# Patient Record
Sex: FEMALE | Race: WHITE | NOT HISPANIC OR LATINO | URBAN - METROPOLITAN AREA
[De-identification: names, ages, dates, MRNs, and addresses within clinical notes are randomized per-mention and may not be internally consistent; named-entity substitution may affect disease eponyms.]

---

## 2017-05-21 ENCOUNTER — EMERGENCY (EMERGENCY)
Facility: HOSPITAL | Age: 32
LOS: 1 days | Discharge: HOME | End: 2017-05-21
Attending: EMERGENCY MEDICINE | Admitting: EMERGENCY MEDICINE
Payer: MEDICAID

## 2017-05-21 VITALS
SYSTOLIC BLOOD PRESSURE: 133 MMHG | OXYGEN SATURATION: 100 % | RESPIRATION RATE: 18 BRPM | HEART RATE: 96 BPM | DIASTOLIC BLOOD PRESSURE: 86 MMHG

## 2017-05-21 VITALS
OXYGEN SATURATION: 100 % | TEMPERATURE: 98 F | RESPIRATION RATE: 18 BRPM | SYSTOLIC BLOOD PRESSURE: 137 MMHG | HEART RATE: 102 BPM | DIASTOLIC BLOOD PRESSURE: 95 MMHG | WEIGHT: 289.47 LBS

## 2017-05-21 DIAGNOSIS — R10.9 UNSPECIFIED ABDOMINAL PAIN: ICD-10-CM

## 2017-05-21 DIAGNOSIS — K59.00 CONSTIPATION, UNSPECIFIED: ICD-10-CM

## 2017-05-21 PROCEDURE — 99283 EMERGENCY DEPT VISIT LOW MDM: CPT | Mod: 25

## 2017-05-21 PROCEDURE — 93005 ELECTROCARDIOGRAM TRACING: CPT

## 2017-05-21 PROCEDURE — 93010 ELECTROCARDIOGRAM REPORT: CPT

## 2017-05-21 PROCEDURE — 74020: CPT

## 2017-05-21 PROCEDURE — 74020: CPT | Mod: 26

## 2017-05-21 PROCEDURE — 99284 EMERGENCY DEPT VISIT MOD MDM: CPT | Mod: 25

## 2017-05-21 RX ORDER — SOD SULF/SODIUM/NAHCO3/KCL/PEG
240 SOLUTION, RECONSTITUTED, ORAL ORAL
Qty: 23040 | Refills: 0 | OUTPATIENT
Start: 2017-05-21 | End: 2017-05-22

## 2017-05-21 NOTE — ED PROVIDER NOTE - MEDICAL DECISION MAKING DETAILS
pt c/o abd pain and constipation x 2 d also wanting explanations for the ct findings (ovarian cyst and inguinal hernia), no concern for sbo or torsion based on abd exam, kub confirmed constipation, will tx w/golytely, high fiber diet advised, to f/u w/gyn for eval and management of cyst and surg for elective hernia repair, no indication to rescan at this time, pt understands and agrees w/plan

## 2017-05-21 NOTE — ED PROVIDER NOTE - ATTENDING CONTRIBUTION TO CARE
32yoF here with abd pain x one year, seen at urgent care 2 days ago for same and had CT scan done with findings of inguinal hernia and ovarian cyst, pt confused about diagnosis, asking for clarification and also reporting constipation x2 days, no n/v, no f/c, no urinary sx. Vitals stable, exam with nontender abd, xray with moderate stool, pt and family provided information and understanding of CT findings, d/c home, to f/up with pmd.

## 2017-05-21 NOTE — ED ADULT TRIAGE NOTE - ARRIVAL INFO ADDITIONAL COMMENTS
Pt C/O lower abdominal pain and nausea. Pt denies vomiting, fever, chills, vaginal bleed, SOB. Last BM was 2days ago. EKG in progress.

## 2017-05-21 NOTE — ED ADULT NURSE NOTE - OBJECTIVE STATEMENT
Patient c/o abdominal pain and constipation. Patient reports that she has history of ovarian cyst. Abdomen soft, non-distended, non-tender. Will continue to monitor patient.

## 2017-06-23 ENCOUNTER — EMERGENCY (EMERGENCY)
Facility: HOSPITAL | Age: 32
LOS: 1 days | Discharge: HOME | End: 2017-06-23
Attending: EMERGENCY MEDICINE | Admitting: EMERGENCY MEDICINE
Payer: SELF-PAY

## 2017-06-23 VITALS
TEMPERATURE: 98 F | RESPIRATION RATE: 17 BRPM | DIASTOLIC BLOOD PRESSURE: 84 MMHG | OXYGEN SATURATION: 100 % | SYSTOLIC BLOOD PRESSURE: 134 MMHG | HEART RATE: 115 BPM

## 2017-06-23 PROCEDURE — 99283 EMERGENCY DEPT VISIT LOW MDM: CPT | Mod: 25

## 2017-06-23 PROCEDURE — 73130 X-RAY EXAM OF HAND: CPT

## 2017-06-23 PROCEDURE — 73130 X-RAY EXAM OF HAND: CPT | Mod: 26,LT

## 2017-06-23 PROCEDURE — 99283 EMERGENCY DEPT VISIT LOW MDM: CPT

## 2017-06-23 RX ORDER — IBUPROFEN 200 MG
600 TABLET ORAL ONCE
Qty: 0 | Refills: 0 | Status: COMPLETED | OUTPATIENT
Start: 2017-06-23 | End: 2017-06-23

## 2017-06-23 RX ADMIN — Medication 600 MILLIGRAM(S): at 19:59

## 2017-06-23 NOTE — ED PROVIDER NOTE - OBJECTIVE STATEMENT
31yo female pt, steady gait, was brought by EMS c/o left 2,3,4th finger pain s/p injury today, jammed by a metal door (STAPLES). Denies other injuries. Denies sensory changes or weakness to fingers.

## 2017-06-23 NOTE — ED PROVIDER NOTE - PHYSICAL EXAMINATION
NAD, VSS, Afebrile, No spinal tender, + left 2,3,4th phalanx, around DIP tender, mid swelling, N/V- intact, no deformities.

## 2017-06-23 NOTE — ED PROVIDER NOTE - ATTENDING CONTRIBUTION TO CARE
Dr. Samano (Attending Physician)  I performed a history and physical exam of the patient and discussed their management with the advanced care provider. I reviewed the advanced care provider's note and agree with the documented findings and plan of care. My medical decision making and objective findings are found above.

## 2017-06-23 NOTE — ED PROVIDER NOTE - MEDICAL DECISION MAKING DETAILS
Dr. Samano (Attending Physician)  2nd - 4th digits hit in door.  ttp with contusion over mid 3rd phalanx, will xray and give ibuprofen reassess.

## 2017-06-23 NOTE — ED ADULT NURSE NOTE - OBJECTIVE STATEMENT
33yo female pt, steady gait, was brought by EMS c/o left 2,3,4th finger pain s/p injury today, jammed by a metal door (STAPLES). Denies other injuries.

## 2022-10-15 ENCOUNTER — EMERGENCY (EMERGENCY)
Facility: HOSPITAL | Age: 37
LOS: 1 days | Discharge: HOME | End: 2022-10-15
Attending: STUDENT IN AN ORGANIZED HEALTH CARE EDUCATION/TRAINING PROGRAM | Admitting: STUDENT IN AN ORGANIZED HEALTH CARE EDUCATION/TRAINING PROGRAM

## 2022-10-15 VITALS
HEART RATE: 109 BPM | TEMPERATURE: 98 F | OXYGEN SATURATION: 99 % | RESPIRATION RATE: 16 BRPM | SYSTOLIC BLOOD PRESSURE: 143 MMHG | DIASTOLIC BLOOD PRESSURE: 92 MMHG

## 2022-10-15 LAB
ALBUMIN SERPL ELPH-MCNC: 4.7 G/DL — SIGNIFICANT CHANGE UP (ref 3.3–5)
ALP SERPL-CCNC: 86 U/L — SIGNIFICANT CHANGE UP (ref 40–120)
ALT FLD-CCNC: 26 U/L — SIGNIFICANT CHANGE UP (ref 4–33)
ANION GAP SERPL CALC-SCNC: 12 MMOL/L — SIGNIFICANT CHANGE UP (ref 7–14)
AST SERPL-CCNC: 23 U/L — SIGNIFICANT CHANGE UP (ref 4–32)
BASOPHILS # BLD AUTO: 0.11 K/UL — SIGNIFICANT CHANGE UP (ref 0–0.2)
BASOPHILS NFR BLD AUTO: 1.1 % — SIGNIFICANT CHANGE UP (ref 0–2)
BILIRUB SERPL-MCNC: 0.4 MG/DL — SIGNIFICANT CHANGE UP (ref 0.2–1.2)
BUN SERPL-MCNC: 9 MG/DL — SIGNIFICANT CHANGE UP (ref 7–23)
CALCIUM SERPL-MCNC: 10 MG/DL — SIGNIFICANT CHANGE UP (ref 8.4–10.5)
CHLORIDE SERPL-SCNC: 98 MMOL/L — SIGNIFICANT CHANGE UP (ref 98–107)
CO2 SERPL-SCNC: 24 MMOL/L — SIGNIFICANT CHANGE UP (ref 22–31)
CREAT SERPL-MCNC: 0.71 MG/DL — SIGNIFICANT CHANGE UP (ref 0.5–1.3)
EGFR: 112 ML/MIN/1.73M2 — SIGNIFICANT CHANGE UP
EOSINOPHIL # BLD AUTO: 0.09 K/UL — SIGNIFICANT CHANGE UP (ref 0–0.5)
EOSINOPHIL NFR BLD AUTO: 0.9 % — SIGNIFICANT CHANGE UP (ref 0–6)
GLUCOSE SERPL-MCNC: 106 MG/DL — HIGH (ref 70–99)
HCG SERPL-ACNC: <5 MIU/ML — SIGNIFICANT CHANGE UP
HCT VFR BLD CALC: 39.3 % — SIGNIFICANT CHANGE UP (ref 34.5–45)
HGB BLD-MCNC: 12.3 G/DL — SIGNIFICANT CHANGE UP (ref 11.5–15.5)
IANC: 6.89 K/UL — SIGNIFICANT CHANGE UP (ref 1.8–7.4)
IMM GRANULOCYTES NFR BLD AUTO: 0.3 % — SIGNIFICANT CHANGE UP (ref 0–0.9)
LYMPHOCYTES # BLD AUTO: 2.06 K/UL — SIGNIFICANT CHANGE UP (ref 1–3.3)
LYMPHOCYTES # BLD AUTO: 21.4 % — SIGNIFICANT CHANGE UP (ref 13–44)
MCHC RBC-ENTMCNC: 25.3 PG — LOW (ref 27–34)
MCHC RBC-ENTMCNC: 31.3 GM/DL — LOW (ref 32–36)
MCV RBC AUTO: 80.7 FL — SIGNIFICANT CHANGE UP (ref 80–100)
MONOCYTES # BLD AUTO: 0.43 K/UL — SIGNIFICANT CHANGE UP (ref 0–0.9)
MONOCYTES NFR BLD AUTO: 4.5 % — SIGNIFICANT CHANGE UP (ref 2–14)
NEUTROPHILS # BLD AUTO: 6.89 K/UL — SIGNIFICANT CHANGE UP (ref 1.8–7.4)
NEUTROPHILS NFR BLD AUTO: 71.8 % — SIGNIFICANT CHANGE UP (ref 43–77)
NRBC # BLD: 0 /100 WBCS — SIGNIFICANT CHANGE UP (ref 0–0)
NRBC # FLD: 0 K/UL — SIGNIFICANT CHANGE UP (ref 0–0)
NT-PROBNP SERPL-SCNC: <5 PG/ML — SIGNIFICANT CHANGE UP
PLATELET # BLD AUTO: 486 K/UL — HIGH (ref 150–400)
POTASSIUM SERPL-MCNC: 3.4 MMOL/L — LOW (ref 3.5–5.3)
POTASSIUM SERPL-SCNC: 3.4 MMOL/L — LOW (ref 3.5–5.3)
PROT SERPL-MCNC: 7.7 G/DL — SIGNIFICANT CHANGE UP (ref 6–8.3)
RBC # BLD: 4.87 M/UL — SIGNIFICANT CHANGE UP (ref 3.8–5.2)
RBC # FLD: 13.7 % — SIGNIFICANT CHANGE UP (ref 10.3–14.5)
SODIUM SERPL-SCNC: 134 MMOL/L — LOW (ref 135–145)
TROPONIN T, HIGH SENSITIVITY RESULT: <6 NG/L — SIGNIFICANT CHANGE UP
WBC # BLD: 9.61 K/UL — SIGNIFICANT CHANGE UP (ref 3.8–10.5)
WBC # FLD AUTO: 9.61 K/UL — SIGNIFICANT CHANGE UP (ref 3.8–10.5)

## 2022-10-15 PROCEDURE — 93010 ELECTROCARDIOGRAM REPORT: CPT

## 2022-10-15 PROCEDURE — 99285 EMERGENCY DEPT VISIT HI MDM: CPT

## 2022-10-15 RX ORDER — ACETAMINOPHEN 500 MG
975 TABLET ORAL ONCE
Refills: 0 | Status: COMPLETED | OUTPATIENT
Start: 2022-10-15 | End: 2022-10-15

## 2022-10-15 RX ADMIN — Medication 975 MILLIGRAM(S): at 23:28

## 2022-10-15 NOTE — ED PROVIDER NOTE - PHYSICAL EXAMINATION
Physical Exam:  Gen: NAD, non-toxic appearing, awake alert   HEENT: normal conjunctiva, oral mucosa moist  Lung: CTAB, no respiratory distress, no wheezes/rhonchi/rales B/L, speaking in full sentences  CV: RRR  Abd: soft, NT, ND, no guarding, no rigidity, no rebound tenderness  MSK: no visible deformities  Neuro: No focal sensory or motor deficits  Skin: Warm, well perfused, no rash, no leg swelling  ~Kendall Taylor MD (PGY-3) Physical Exam:  Gen: NAD, non-toxic appearing, awake alert   HEENT: normal conjunctiva, oral mucosa moist  Lung: CTAB, no respiratory distress, no wheezes/rhonchi/rales B/L, speaking in full sentences  CV: RRR, TTP of substernal chest wall, worsening of chest pain when pt twists her torso  Abd: soft, NT, ND, no guarding, no rigidity, no rebound tenderness  MSK: no visible deformities  Neuro: No focal sensory or motor deficits  Skin: Warm, well perfused, no rash, no leg swelling  ~Kendall Taylor MD (PGY-3)

## 2022-10-15 NOTE — ED PROVIDER NOTE - PROGRESS NOTE DETAILS
Brandon CONNOLLY (PGY-3)  explained results of w/u to pt. given return precautions. pt has cards f/u. will d/c to home with pcp f/u and return precautions

## 2022-10-15 NOTE — ED PROVIDER NOTE - PATIENT PORTAL LINK FT
You can access the FollowMyHealth Patient Portal offered by [facility] by registering at the following website: [email or web address]. By joining [facility]’s FollowMyHealth portal, you will also be able to view your health information using other applications (apps) compatible with our system.

## 2022-10-15 NOTE — ED PROVIDER NOTE - CLINICAL SUMMARY MEDICAL DECISION MAKING FREE TEXT BOX
37F PMH HTN p/w substernal cp pounding in sensation w/o SOB while shopping 8 hrs ago, constant, no a/w n/v/diaphoresis. VSS in ED. ecg nondiagnostic. Lungs ctab, abd NT ,no leg swelling  Will eval for ACS vs. infectious etiology, although MSK etiology more likely. Unlikely PE or dissection based off exam and hx. Takotsubos also possible given pt has symptoms starting since recent emotional stressor  Plan: cardiac labs, cxr, pain control, reassess smyptoms

## 2022-10-15 NOTE — ED PROVIDER NOTE - NS ED ROS FT
CONST: no fevers, no chills  ENT: no sore throat  CV: +chest pain, no leg swelling  RESP: +shortness of breath, no cough  ABD: no abdominal pain, no nausea, no vomiting, no diarrhea  : no dysuria, no flank pain, no hematuria  MSK: no back pain, no extremity pain  NEURO: no headache or additional neurologic complaints  SKIN:  no rash

## 2022-10-15 NOTE — ED PROVIDER NOTE - ATTENDING CONTRIBUTION TO CARE
I have personally performed a face to face medical and diagnostic evaluation of the patient. I have discussed with and reviewed the Resident's note and agree with the History, ROS, Physical Exam and MDM unless otherwise indicated. A brief summary of my personal evaluation and impression can be found below.    37y Fw/  PMHx HTN, obesity and former smoker (quit 1 month ago) p/w substernal chest pressure, pounding in sensation w/ SOB while shopping 8 hrs ago. States pain has been constant but not a/w n/v or diaphoresis. No f/c, abd pain, urinary symptoms, leg swelling. No prior cardiac w/u. FHx of cardiac disease in father in old age. No fhx of sudden cardiac death. Pt states she has been having intermittent chest pain for past few weeks after her father recently passed away. Denies hx of PE/DVT, recent travel, OCP use, denies fever, chills, cough, abd pain, focal weakness or numbness. Patient has appt with cardiologist next week to have stress test/echo performed.       On exam: VS WNL, lungs CTAB, abd soft, NT, no LE swelling, neuro intact    Will obtain trop, labs, dimer, EKG, CXR, keep on cardiac monitor reassess.

## 2022-10-15 NOTE — ED PROVIDER NOTE - NSFOLLOWUPINSTRUCTIONS_ED_ALL_ED_FT
You were seen today in the emergency room for chest pain    You need to follow up with your doctor and a cardiologist in the next 48-72 hours.     If you develop any new or worsening symptoms you need to return immediately to the emergency department. If you experience any of the following please come right back to the emergency room: chest pain that becomes much worse with walking up stairs or exercising, uncontrollable nausea and vomiting, severe chest pain that will not go away, passing out, new persistent numbness and/or weakness

## 2022-10-15 NOTE — ED ADULT NURSE NOTE - OBJECTIVE STATEMENT
[facility] Nurse Note- pt presents to ED stating that she had an episode of CP at 2pm today while shopping, didn't lift anything heavy, states she became anxious and had "a little" SOB, took 1 baby asa. Hx of this happening once a month ago, had abnormal EKG at urgent care but was unable to get an appt for follow up. Pt is sitting on stretcher in no acute distress, respirations even and unlabored., skin warm and dry. Denies fever, chills, abd pain, calf pain, pregnancy, LMP last week. iv placed, labs drawn and sent. 20 ga R FA. Pt endorsed to primary RN

## 2022-10-16 VITALS
OXYGEN SATURATION: 100 % | RESPIRATION RATE: 18 BRPM | HEART RATE: 95 BPM | SYSTOLIC BLOOD PRESSURE: 113 MMHG | DIASTOLIC BLOOD PRESSURE: 82 MMHG | TEMPERATURE: 98 F

## 2022-10-16 LAB
D DIMER BLD IA.RAPID-MCNC: <150 NG/ML DDU — SIGNIFICANT CHANGE UP
FLUAV AG NPH QL: SIGNIFICANT CHANGE UP
FLUBV AG NPH QL: SIGNIFICANT CHANGE UP
RSV RNA NPH QL NAA+NON-PROBE: SIGNIFICANT CHANGE UP
SARS-COV-2 RNA SPEC QL NAA+PROBE: SIGNIFICANT CHANGE UP

## 2022-10-16 PROCEDURE — 71046 X-RAY EXAM CHEST 2 VIEWS: CPT | Mod: 26

## 2023-01-12 NOTE — ED PROVIDER NOTE - OBJECTIVE STATEMENT
37F PMH HTN p/w substernal cp pounding in sensation w/o SOB while shopping 8 hrs ago, constant, no a/w n/v/diaphoresis. No f/c, abd pain, urinary symptoms, leg swelling. No prior cardiac w/u. FHx of cardiac disease in father in old age. No fhx of sudden cardiac death. Pt normally gets cp for past few weeks. stopped smoking 1 month ago. No recent immobilization, personal/family of blood clots    pcp Dr. Rachel Mcrae 37F PMH HTN p/w substernal cp pounding in sensation w/o SOB while shopping 8 hrs ago, constant, no a/w n/v/diaphoresis. No f/c, abd pain, urinary symptoms, leg swelling. No prior cardiac w/u. FHx of cardiac disease in father in old age. No fhx of sudden cardiac death. Pt normally gets cp for past few weeks. stopped smoking 1 month ago. No recent immobilization, personal/family of blood clots    pt has appt with cardiologist next week to have stress test/echo  pcp Dr. Rachel Mcrae 37F PMH HTN p/w substernal cp pounding in sensation w/o SOB while shopping 8 hrs ago, constant, no a/w n/v/diaphoresis. No f/c, abd pain, urinary symptoms, leg swelling. No prior cardiac w/u. FHx of cardiac disease in father in old age. No fhx of sudden cardiac death. Pt normally gets cp for past few weeks. stopped smoking 1 month ago. No recent immobilization, personal/family of blood clots. Pt's father recently passed away from stroke which has mari stressful on pt    pt has appt with cardiologist next week to have stress test/echo  pcp Dr. Rachel Mcrae V-Y Plasty Text: The defect edges were debeveled with a #15 scalpel blade.  Given the location of the defect, shape of the defect and the proximity to free margins an V-Y advancement flap was deemed most appropriate.  Using a sterile surgical marker, an appropriate advancement flap was drawn incorporating the defect and placing the expected incisions within the relaxed skin tension lines where possible.    The area thus outlined was incised deep to adipose tissue with a #15 scalpel blade.  The skin margins were undermined to an appropriate distance in all directions utilizing iris scissors.

## 2024-06-07 NOTE — ED ADULT NURSE NOTE - PATIENT DISCHARGE SIGNATURE
Care Management Follow Up    Length of Stay (days): 3    Expected Discharge Date: 06/08/2024     Concerns to be Addressed: discharge planning     Patient plan of care discussed at interdisciplinary rounds: No    Anticipated Discharge Disposition: Home     Anticipated Discharge Services: Other (see comment) (out patient infusion)  Anticipated Discharge DME: Other (see comment) (no new DME)    Patient/family educated on Medicare website which has current facility and service quality ratings:    Education Provided on the Discharge Plan: Yes  Patient/Family in Agreement with the Plan: yes    Referrals Placed by CM/SW:    Private pay costs discussed: Not applicable    Additional Information:    Met with Johanny in her room.  Explained that the twice a week calcium labs were scheduled along with an infusion time should she need one.  Verbalized understanding.  She will arrange [employer].    Aicha Sapp, RN , BA   6/7/2024  Nurse Coordinator      Social Work and Care Management Department       SEARCHABLE in [facility] - search CARE COORDINATOR       [place] [employer] [place] ([phone]) Saturday & Sunday; ([phone]) [facility] Recognized Holidays     Units: 5A Onc 5201 - 5219 RNCC,  5A Onc 5220 thru 5240 RNCC, 5C OFFSERVICE [phone] RNCC & 5C OFF SERVICE [phone] RNCC       Units: 6B Vocera, 6C Card 6401 thru 6420 RNCC, 6C Card 6502 thru 6514 RNCC & 6C Card 6515 thru 6519 RNCC        Units: 7A SOT RNCC Vocera, 7B Med Surg Vocera, 7C Med Surg 7401 thru 7418 RNCC & 7C Med Surg 7502 thru 7521 RNCC       Units: 6A Vocera & 4A CVICU Vocera, 4C MICU Vocera, and 4E SICU Vocera         Units: 5 Ortho Vocera & 5 Med Surg Vocera        Units: 6 Med Surg Vocera & 8 Med Surg Vocera             21-May-2017